# Patient Record
(demographics unavailable — no encounter records)

---

## 2017-03-28 NOTE — NUR
Patient discharged with v/s stable. Written and verbal after care instructions 
given and explained to parent/guardian. Parent/Guardian verbalized 
understanding of instructions. Carried with by parent. All questions addressed 
prior to discharge. ID band removed. Parent/Guardian advised to follow up with 
PMD. Rx of ERYTHROMYCIN 0.5% OPH OINT given. Parent/Guardian educated on 
indication of medication including possible reaction and side effects. 
Opportunity to ask questions provided and answered.

## 2017-03-28 NOTE — NUR
PATIENT PRESENTS TO ED WITH COUGH AND BILATERAL EYE DRAINAGE . BIB MOTHER WHO 
DENIES N/V/D; SKIN IS PINK/WARM/DRY; AAOX4 WITH EVEN AND STEADY GAIT; MOTHER 
DENIES ANY  CP, SOB, AT THIS TIME; PATIENT STATES PAIN OF 0/10 AT THIS TIME; 
VSS; PATIENT POSITIONED FOR COMFORT;